# Patient Record
Sex: FEMALE | Race: WHITE | NOT HISPANIC OR LATINO | URBAN - METROPOLITAN AREA
[De-identification: names, ages, dates, MRNs, and addresses within clinical notes are randomized per-mention and may not be internally consistent; named-entity substitution may affect disease eponyms.]

---

## 2025-02-05 ENCOUNTER — APPOINTMENT (OUTPATIENT)
Dept: ENDOCRINOLOGY | Facility: CLINIC | Age: 60
End: 2025-02-05
Payer: COMMERCIAL

## 2025-03-05 ENCOUNTER — APPOINTMENT (OUTPATIENT)
Dept: ENDOCRINOLOGY | Facility: CLINIC | Age: 60
End: 2025-03-05
Payer: COMMERCIAL

## 2025-03-05 ENCOUNTER — TELEPHONE (OUTPATIENT)
Dept: ENDOCRINOLOGY | Facility: CLINIC | Age: 60
End: 2025-03-05

## 2025-03-05 VITALS
HEART RATE: 94 BPM | TEMPERATURE: 96.4 F | DIASTOLIC BLOOD PRESSURE: 84 MMHG | BODY MASS INDEX: 35.52 KG/M2 | HEIGHT: 65 IN | WEIGHT: 213.2 LBS | SYSTOLIC BLOOD PRESSURE: 128 MMHG

## 2025-03-05 DIAGNOSIS — E66.01 CLASS 2 SEVERE OBESITY WITH SERIOUS COMORBIDITY AND BODY MASS INDEX (BMI) OF 35.0 TO 35.9 IN ADULT, UNSPECIFIED OBESITY TYPE: Primary | ICD-10-CM

## 2025-03-05 DIAGNOSIS — G47.33 OSA (OBSTRUCTIVE SLEEP APNEA): ICD-10-CM

## 2025-03-05 DIAGNOSIS — E66.812 CLASS 2 SEVERE OBESITY WITH SERIOUS COMORBIDITY AND BODY MASS INDEX (BMI) OF 35.0 TO 35.9 IN ADULT, UNSPECIFIED OBESITY TYPE: Primary | ICD-10-CM

## 2025-03-05 DIAGNOSIS — Z00.00 HEALTH MAINTENANCE EXAMINATION: ICD-10-CM

## 2025-03-05 DIAGNOSIS — E11.65 CONTROLLED TYPE 2 DIABETES MELLITUS WITH HYPERGLYCEMIA, WITHOUT LONG-TERM CURRENT USE OF INSULIN: ICD-10-CM

## 2025-03-05 LAB — POC HEMOGLOBIN A1C: 7.4 % (ref 4.2–6.5)

## 2025-03-05 PROCEDURE — 4010F ACE/ARB THERAPY RXD/TAKEN: CPT | Performed by: NURSE PRACTITIONER

## 2025-03-05 PROCEDURE — 83036 HEMOGLOBIN GLYCOSYLATED A1C: CPT | Performed by: NURSE PRACTITIONER

## 2025-03-05 PROCEDURE — 3074F SYST BP LT 130 MM HG: CPT | Performed by: NURSE PRACTITIONER

## 2025-03-05 PROCEDURE — 3079F DIAST BP 80-89 MM HG: CPT | Performed by: NURSE PRACTITIONER

## 2025-03-05 PROCEDURE — 3008F BODY MASS INDEX DOCD: CPT | Performed by: NURSE PRACTITIONER

## 2025-03-05 PROCEDURE — 99205 OFFICE O/P NEW HI 60 MIN: CPT | Performed by: NURSE PRACTITIONER

## 2025-03-05 RX ORDER — TIRZEPATIDE 2.5 MG/.5ML
2.5 INJECTION, SOLUTION SUBCUTANEOUS
Qty: 2 ML | Refills: 1 | Status: SHIPPED | OUTPATIENT
Start: 2025-03-09

## 2025-03-05 RX ORDER — LOSARTAN POTASSIUM 100 MG/1
100 TABLET ORAL
COMMUNITY
Start: 2024-12-09 | End: 2025-12-09

## 2025-03-05 RX ORDER — ERGOCALCIFEROL 1.25 MG/1
1 CAPSULE ORAL
COMMUNITY
Start: 2024-07-09

## 2025-03-05 RX ORDER — AMLODIPINE BESYLATE 2.5 MG/1
1 TABLET ORAL
COMMUNITY
Start: 2024-09-18 | End: 2025-03-05 | Stop reason: WASHOUT

## 2025-03-05 RX ORDER — DULOXETIN HYDROCHLORIDE 60 MG/1
60 CAPSULE, DELAYED RELEASE ORAL
COMMUNITY
Start: 2024-09-10

## 2025-03-05 RX ORDER — PANTOPRAZOLE SODIUM 40 MG/1
40 TABLET, DELAYED RELEASE ORAL DAILY
COMMUNITY

## 2025-03-05 RX ORDER — BLOOD-GLUCOSE SENSOR
EACH MISCELLANEOUS
Qty: 6 EACH | Refills: 3 | Status: SHIPPED | OUTPATIENT
Start: 2025-03-05

## 2025-03-05 RX ORDER — AMLODIPINE BESYLATE 10 MG/1
1 TABLET ORAL
COMMUNITY
Start: 2024-06-21

## 2025-03-05 ASSESSMENT — ENCOUNTER SYMPTOMS
NAUSEA: 0
SHORTNESS OF BREATH: 0
NERVOUS/ANXIOUS: 0
DYSPHORIC MOOD: 0
WHEEZING: 0
ARTHRALGIAS: 0
PALPITATIONS: 0
POLYDIPSIA: 0
POLYPHAGIA: 0
NUMBNESS: 0
FATIGUE: 0
FREQUENCY: 0
CONSTIPATION: 0

## 2025-03-05 ASSESSMENT — PATIENT HEALTH QUESTIONNAIRE - PHQ9
1. LITTLE INTEREST OR PLEASURE IN DOING THINGS: NOT AT ALL
2. FEELING DOWN, DEPRESSED OR HOPELESS: NOT AT ALL
SUM OF ALL RESPONSES TO PHQ9 QUESTIONS 1 AND 2: 0

## 2025-03-05 NOTE — PROGRESS NOTES
"Olesya Stafford presents for weight loss management and obesity consult today.  Referred into the program by a friend who presents with her today who is in the weight management group with Dr. Horton    Obesity History:  Childhood or teen obesity history: no  Age of Onset:  mid adult after having kids and also after 3 months- no event identified over  Lowest adult weight: \"very thin\" 160  Highest adult weight: 213  Current weight: 213     Family history of obesity: no    Biggest challenge with weight management: motivation and energy  Goal: to be healthy and more mobile     History of Weight Loss Efforts: yes  Successful weight loss techniques attempted: Weight Watchers  Unsuccessful weight loss techniques attempted:  victoza    Current typical daily diet:  Typical Breakfast: cereal or bagel with cream cheese  Typical Lunch: varies, sometimes fast food or a sandwich  Typical Dinner: pasta and pizza  Soda/latte weekly intake:  Take out/carry out/fast food weekly: \"a lot of pop\", moscato  Portion sizes/seconds with meals: medium    Snacking  \"Love to snack\"  Daytime snacks: yes  Evening snacks: yes      Describe Appetite (always hungry/no hunger/average): \"I could eat all the time, I can eat when I am not hungry\", not hungry  Are you full after a meal?  yes  Food Noise: yes  Emotional eater? Yes   Boredom eater? Yes     Current Exercise Habits none    Sleep patterns (insomnia, waking in night) /hours of sleep per night: severe GELA prescribed CPAP  H/O Sleep apnea: yes  Well rested? No     Increased Stressors (describe daily stress): no      Any intake of an appetite suppressant or an anti-obesity medicine? No     H/O Pancreatitis: no  H/O Thyroid Medullary Cancer: no    Past Medical History:   Diagnosis Date    Personal history of other diseases of the digestive system     History of diverticulitis       Current Outpatient Medications   Medication Sig Dispense Refill    amLODIPine (Norvasc) 10 mg tablet Take 1 tablet " "(10 mg) by mouth early in the morning..      DULoxetine (Cymbalta) 60 mg DR capsule Take 1 capsule (60 mg) by mouth once daily.      ergocalciferol (Vitamin D-2) 1250 mcg (50,000 units) capsule Take 1 capsule (1,250 mcg) by mouth 1 (one) time per week.      losartan (Cozaar) 100 mg tablet Take 1 tablet (100 mg) by mouth once daily.      pantoprazole (ProtoNix) 40 mg EC tablet Take 1 tablet (40 mg) by mouth once daily.       No current facility-administered medications for this visit.           Review of Systems  Review of Systems   Constitutional:  Negative for fatigue.   Respiratory:  Negative for shortness of breath and wheezing.    Cardiovascular:  Negative for chest pain and palpitations.   Gastrointestinal:  Negative for constipation and nausea.   Endocrine: Negative for polydipsia, polyphagia and polyuria.   Genitourinary:  Negative for frequency and urgency.   Musculoskeletal:  Negative for arthralgias.   Skin:  Negative for rash.   Neurological:  Negative for numbness.   Psychiatric/Behavioral:  Negative for dysphoric mood. The patient is not nervous/anxious.            Objective   /84 (BP Location: Right arm, Patient Position: Sitting, BP Cuff Size: Adult)   Pulse 94   Temp 35.8 °C (96.4 °F) (Temporal)   Ht 1.651 m (5' 5\")   Wt 96.7 kg (213 lb 3.2 oz)   LMP 02/01/2019 (Approximate) Comment: Menopause  BMI 35.48 kg/m²     Physical Exam  Physical Exam  Vitals reviewed.   Constitutional:       General: She is not in acute distress.     Appearance: Normal appearance. She is obese.   Neck:      Comments: Thyroid exam is normal, no palpable nodules  No goiter is noted  Pulmonary:      Effort: Pulmonary effort is normal.   Neurological:      Mental Status: She is alert and oriented to person, place, and time.   Psychiatric:         Mood and Affect: Mood normal.         Behavior: Behavior normal.         Thought Content: Thought content normal.         Judgment: Judgment normal.         Lab Review  Lab " "Results   Component Value Date    HGBA1C 7.4 (A) 03/05/2025     No results found for: \"LDLCALC\"    Weight Trends  Trends of weight reviewed in visit, see graph below:  Wt Readings from Last 3 Encounters:   03/05/25 96.7 kg (213 lb 3.2 oz)   (  Assessment/Plan     Follow up and Goals:  Nutrition: Consult with the dietitians. Limit to a zero calorie pop per day. Aim to drink 64 ounces a day. You can use berries in the water. Replace the Am meal with hard boiled/scrambled with fruit, shakes Fairlife protein with fruit, greek yogurt with berries and [place], overnight oats. Lunch and dinner - review Healthy Plate  Medications: Please see below and MyChart  We reviewed information on Semglutide Give it weekly, rotate sites every week in the abdomen or the thigh.  It will slow movement of the bowels for feeling of fullness and  you will also have decreased sense of hunger and decreased appetite. If this does not occur at the beginning dose it will occur as we increase the dose. The dose is increased every 4 weeks. Please message me once you take the third dose so we can discuss if we will increase the dose and I can put in another order. Common side effects are GI side effects of nausea and constipation and typically subside over time. Please inform me of intolerable side effects.  Some patients on the GLP1 medications (Ozempic, Mounjaro, Wegovy, Zepboud) can experience nausea and/or constipation. For the nausea I recommend using Tums over the counter first, if this is not effective we can use some Zofran. Avoid highly processed and fried foods. Make sure to avoid constipation by having high fiber foods (veggies, fruit). Make sure to also have enough water with at least 64 ounces per day. If the bowels are moving very slow and you feel constipated use Miralax or Benefiber which you can purchase at a local pharmacy.  Physical Exercise: Initiate the routine- find 3-4 days int he week and kaylah on the calendar aim at " 30 minutes total in that day foe moderate exercise.  Focus on strength and resistance. YouTube channel HasFit, Audra & Juice, Yoga Sharlene. Do modifications  Sensor- we place a Joseph sensor for monitoring of your glucose values. If insurance does not cover it please call customer service for Joseph: [phone]  Follow up: Schedule with dietitian as soon as possible, one  on one virtual visit in 3 week      Visit length of 45 minutes      Problem List Items Addressed This Visit       Class 2 severe obesity with serious comorbidity and body mass index (BMI) of 35.0 to 35.9 in adult - Primary    GELA (obstructive sleep apnea)    Health maintenance examination    Relevant Orders    POCT glycosylated hemoglobin (Hb A1C) manually resulted (Completed)    Referral to Nutrition Services    Controlled type 2 diabetes mellitus with hyperglycemia, without long-term current use of insulin       Diet interventions: referral to dietitian for guidance in these changes  Discussed Mediterranean Diet, given pamphlet or it will be mailed, we looked at ADA plate, portion sizes, recipes. Advised to review in preparation for nutrition consult    Handouts given:  yes    Exercise intervention:   We discussed the importance of incorporating resistance and free weight  lifting into physical activity routine to prevent muscle wasting with weight loss, enhance bone health, the positive role increased muscle has in burning fat at rest. We looked at examples of these types of exercise routines online. Advised to do modifications. Advised to check with PCP if there is a h/o musculoskeletal injury or hx. We discussed benefits of walking with weight loss and as a cardio form of exercise. Gradually increase exercise to a goal of 150 minutes at least per week.        Follow Up:  Referred to nutrition or continue to work with current dietitian   Discussed obesity medications, side effects and mechanism of action reviewed with patient  Discussed  physical activity: we reviewed Youtube videos for strength training, advised to use modifications for these videos, we discussed benefits of strength training and resistance bands  on bone and muscle health, we discussed walking and water aerobic options for cardio  Discussed Mediterranean Diet, given pamphlet or it will be mailed, we looked at ADA plate, portion sizes, recipes. Advised to review Mediterranean Meal Plan booklet  in preparation for nutrition consult  ....  1 month group visit and nutrition visit in person or  virtual  Please reach out if you need anything or have further questions

## 2025-03-05 NOTE — PROGRESS NOTES
Diabetes Educator Note  Met with patient at the request of JYOTI Whitehead for Joseph 3+ CGM training.     Patient will utilize the  to obtain data and agrees to bring the device to all appointments.  Patient was able to download and set up the chelsey on their phone.    Provided education on the following  Frequency of sensor changes every 15 days  Frequency of transmitter changes every 3 months (if applicable- Dexcom G6 only))  Availability of replacement sensors through [employer] [phone] at no cost to pt in events of malfunction or falling off early.   Need to keep  or cell phone within 20 feet so glucose levels are recorded  Discussed difference between serum glucose and blood glucose levels  Reviewed how to calibrate if necessary/ available (Dexcom only)  Discussed what to do if patient symptoms are different from sensor data (check fingerstick blood sugar)  Reviewed how to enter data when looking at glucose reading such as food, exercise, medication  Discussed that Joseph View does NOT alert provider to readings. Pt needs to call or send my chart message if pt needs provider to look at glucose readings  Reviewed data that is available through chelsey and target goals  Avg glucoses  Time in range  Sensor capture rate    Settings:  Low alarm setting 70  High alarm setting 250    Patient was able to insert sensor with verbal cueing per  instructions. Patient tolerated well.  Patient was provided time to ask questions. All questions were answered and patient verbalized understanding.      Transmitter ID: n/a  SN/ Sensor Lot #:  [id number]  Exp: 6*30*2025  Patient's device is connected to [email or web address]  Patient will bring device to all appointments.     Plan:  Please continue to wear CGM and follow up for review in 2-4 weeks.  Continue with current medications.   Bring your  to all appointments (if not using your phone)  Contact [employer] [phone] for replacement device in the event of  malfunction or sensor falling off early.

## 2025-03-05 NOTE — Clinical Note
Please schedule for dietitian Please schedule one one one virtual for diabetes follow up 3 weeks Schedule for group visit after the dietitian

## 2025-03-05 NOTE — PATIENT INSTRUCTIONS
Nutrition: Consult with the dietitians. Limit to a zero calorie pop per day. Aim to drink 64 ounces a day. You can use berries in the water. Replace the Am meal with hard boiled/scrambled with fruit, shakes Fairlife protein with fruit, greek yogurt with berries and [place], overnight oats. Lunch and dinner - review Healthy Plate  Medications: Please see below and Bobby  We reviewed information on Semglutide Give it weekly, rotate sites every week in the abdomen or the thigh.  It will slow movement of the bowels for feeling of fullness and  you will also have decreased sense of hunger and decreased appetite. If this does not occur at the beginning dose it will occur as we increase the dose. The dose is increased every 4 weeks. Please message me once you take the third dose so we can discuss if we will increase the dose and I can put in another order. Common side effects are GI side effects of nausea and constipation and typically subside over time. Please inform me of intolerable side effects.  Some patients on the GLP1 medications (Ozempic, Mounjaro, Wegovy, Zepboud) can experience nausea and/or constipation. For the nausea I recommend using Tums over the counter first, if this is not effective we can use some Zofran. Avoid highly processed and fried foods. Make sure to avoid constipation by having high fiber foods (veggies, fruit). Make sure to also have enough water with at least 64 ounces per day. If the bowels are moving very slow and you feel constipated use Miralax or Benefiber which you can purchase at a local pharmacy.  Physical Exercise: Initiate the routine- find 3-4 days int he week and kaylah on the calendar aim at 30 minutes total in that day foe moderate exercise.  Focus on strength and resistance. YouTube channel HasFit, Audra & Juice, Yoga Sharlene. Do modifications  Sensor- we place a Joseph sensor for monitoring of your glucose values. If insurance does not cover it please call customer  service for Joseph: [phone]  Follow up: Schedule with dietitian as soon as possible, one  on one virtual visit in 3 week

## 2025-03-18 ENCOUNTER — APPOINTMENT (OUTPATIENT)
Dept: ENDOCRINOLOGY | Facility: CLINIC | Age: 60
End: 2025-03-18
Payer: COMMERCIAL

## 2025-03-20 ENCOUNTER — APPOINTMENT (OUTPATIENT)
Dept: ENDOCRINOLOGY | Facility: CLINIC | Age: 60
End: 2025-03-20
Payer: COMMERCIAL

## 2025-03-25 ENCOUNTER — APPOINTMENT (OUTPATIENT)
Dept: ENDOCRINOLOGY | Facility: CLINIC | Age: 60
End: 2025-03-25
Payer: COMMERCIAL

## 2025-03-25 DIAGNOSIS — E11.65 CONTROLLED TYPE 2 DIABETES MELLITUS WITH HYPERGLYCEMIA, WITHOUT LONG-TERM CURRENT USE OF INSULIN: ICD-10-CM

## 2025-03-25 DIAGNOSIS — Z00.00 HEALTH MAINTENANCE EXAMINATION: ICD-10-CM

## 2025-04-03 ENCOUNTER — APPOINTMENT (OUTPATIENT)
Dept: ENDOCRINOLOGY | Facility: CLINIC | Age: 60
End: 2025-04-03
Payer: COMMERCIAL

## 2025-04-10 DIAGNOSIS — E11.65 CONTROLLED TYPE 2 DIABETES MELLITUS WITH HYPERGLYCEMIA, WITHOUT LONG-TERM CURRENT USE OF INSULIN: ICD-10-CM

## 2025-04-10 RX ORDER — TIRZEPATIDE 5 MG/.5ML
5 INJECTION, SOLUTION SUBCUTANEOUS
Qty: 2 ML | Refills: 2 | Status: SHIPPED | OUTPATIENT
Start: 2025-04-10

## 2025-04-10 NOTE — TELEPHONE ENCOUNTER
LOV: 3/5/25  NOV: not scheduled   Patient left message on office voicemail stating that she is calling to request a refill for the Moujaro and that per your last discussion if tolerating she can increase the dose  Mounjaro 5mg pended    Health Care Proxy (HCP)

## 2025-05-01 ENCOUNTER — APPOINTMENT (OUTPATIENT)
Dept: ENDOCRINOLOGY | Facility: CLINIC | Age: 60
End: 2025-05-01
Payer: COMMERCIAL

## 2025-06-02 DIAGNOSIS — E11.65 CONTROLLED TYPE 2 DIABETES MELLITUS WITH HYPERGLYCEMIA, WITHOUT LONG-TERM CURRENT USE OF INSULIN: ICD-10-CM

## 2025-06-03 RX ORDER — TIRZEPATIDE 5 MG/.5ML
5 INJECTION, SOLUTION SUBCUTANEOUS
Qty: 2 ML | Refills: 2 | Status: SHIPPED | OUTPATIENT
Start: 2025-06-03

## 2025-08-04 ENCOUNTER — TELEPHONE (OUTPATIENT)
Dept: ENDOCRINOLOGY | Facility: CLINIC | Age: 60
End: 2025-08-04
Payer: COMMERCIAL

## 2025-08-11 ENCOUNTER — APPOINTMENT (OUTPATIENT)
Dept: ENDOCRINOLOGY | Facility: CLINIC | Age: 60
End: 2025-08-11
Payer: COMMERCIAL

## 2025-08-14 ENCOUNTER — PATIENT MESSAGE (OUTPATIENT)
Dept: ENDOCRINOLOGY | Facility: CLINIC | Age: 60
End: 2025-08-14
Payer: COMMERCIAL

## 2025-08-14 DIAGNOSIS — E11.65 CONTROLLED TYPE 2 DIABETES MELLITUS WITH HYPERGLYCEMIA, WITHOUT LONG-TERM CURRENT USE OF INSULIN: ICD-10-CM

## 2025-08-14 DIAGNOSIS — E66.01 CLASS 2 SEVERE OBESITY WITH SERIOUS COMORBIDITY AND BODY MASS INDEX (BMI) OF 35.0 TO 35.9 IN ADULT, UNSPECIFIED OBESITY TYPE: Primary | ICD-10-CM

## 2025-08-14 DIAGNOSIS — E66.812 CLASS 2 SEVERE OBESITY WITH SERIOUS COMORBIDITY AND BODY MASS INDEX (BMI) OF 35.0 TO 35.9 IN ADULT, UNSPECIFIED OBESITY TYPE: Primary | ICD-10-CM

## 2025-08-14 DIAGNOSIS — G47.33 OSA (OBSTRUCTIVE SLEEP APNEA): ICD-10-CM

## 2025-08-14 RX ORDER — TIRZEPATIDE 7.5 MG/.5ML
7.5 INJECTION, SOLUTION SUBCUTANEOUS
Qty: 2 ML | Refills: 5 | Status: SHIPPED | OUTPATIENT
Start: 2025-08-17

## 2025-10-16 ENCOUNTER — APPOINTMENT (OUTPATIENT)
Dept: ENDOCRINOLOGY | Facility: CLINIC | Age: 60
End: 2025-10-16
Payer: COMMERCIAL